# Patient Record
Sex: MALE | Race: WHITE | NOT HISPANIC OR LATINO | URBAN - METROPOLITAN AREA
[De-identification: names, ages, dates, MRNs, and addresses within clinical notes are randomized per-mention and may not be internally consistent; named-entity substitution may affect disease eponyms.]

---
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
## 2021-05-27 NOTE — PROGRESS NOTES
[facility]  Clinical Swallow Evaluation and Initial Plan of care    Patient Name: Collins Miner  Date of evaluation: 3/28/2019  Referral Diagnosis: [unfilled]  Referring provider: Carlitos Bryant MD  Visit Diagnosis:     ICD-10-CM    1. Esophageal dysphagia R13.10        Assessment:      Patient presents with no signs and symptoms of aspiration with all textures trialed    Patient demonstrated no oral and no pharyngeal dysphagia.    Patient goal:  None stated; stop vomiting implied  Patient's expectations/goals are realistic.    Rehab potential is good based on prior level of function, evaluation results, recent progress, motivation and cooperation, time post onset, medical status and behavior.     Plan / Patient Instructions:      Plan:  Evaluation only ; provided patient with diaphragmatic breathing techniques and education re: current medical diagnosis. Continuation of investigation may be warranted for psychoemotional treatment and/or GI management.    Diet/Swallow Plan: Regular and thin liquids    Strategies: diaphragmatic breathing techniques in attempts to reduce abdominal spasms contributing to rumination syndrome    Education:   Patient participated in education regarding evaluation results, treatment plan/rationale, home program and expected functional outcome  Patient demonstrate understanding    Referrals: N/A; possible psych eval to r/o emotional disturbances contributing to overall well being.        Subjective:      Social information:   Living Situation:single family home and lives with others    Occupation:student    History of Present Illness:    Collins is a 18 y.o. male who presents to therapy today with complaints of persistent rumination syndrome. Date of onset/duration of symptoms is approx 2 years.      Pertinent history includes listed above. Patient began increasingly nauseated, light headed and experiencing recurring emesis following PO intake approx 3 months ago  during stressful time in school. Patient has had H. Pylori in the past which mother had concerns about as of late. Patient has had multiple consultations re: GI d/t severity of disorder interfering with his education and schooling. Patient reported that he has experienced nxiety and stress in the past but does not suffer on ongoing basis. He is active in 3 sports, school, and friendships. He does not currently work. He is striving to go on to play football at the collegiate level.   Patient presents as alert and cooperative during the session. Patient accompanied to therapy by his mother this AM who exhibits strong involvement in patient's life.     Patient reports no pain     Objective:        Examination:    Patient was given thin and regular solid.    Oral Phase  Dentition: Intact    Oral hygiene: Good    Oral motor function was Not impaired.    Bolus prep and oral control was not impaired. Mastication was not impaired and the patient used rotary chewing.    Anterior-Posterior transit was not impaired.    Oral stasis did not occur with all textures trialed.    Pharyngeal Phase:    Thin:Patient trialed 4 ounces by cup and presented with no s/s of aspiration. Initiation of swallow appeared timely. Hyolaryngeal movement appeared intact .    Regular solid: Patient trialed >4 ounces and presented with no s/s of aspiration. Initiation of swallow appeared timely. Hyolaryngeal movement appeared intact         Interventions Today  Interventions MINUTES   Speech - Language    Cognition    Dysphagia 55   Assistive technology    Voice            Total 55          Michaela Agustin MS, CCC-SLP  3/28/2019

## 2025-08-11 ENCOUNTER — HOSPITAL ENCOUNTER (EMERGENCY)
Facility: CLINIC | Age: 25
Discharge: HOME | End: 2025-08-11
Payer: COMMERCIAL

## 2025-08-11 ENCOUNTER — APPOINTMENT (OUTPATIENT)
Dept: RADIOLOGY | Facility: CLINIC | Age: 25
End: 2025-08-11
Payer: COMMERCIAL

## 2025-08-11 VITALS
SYSTOLIC BLOOD PRESSURE: 139 MMHG | WEIGHT: 181.6 LBS | TEMPERATURE: 98 F | DIASTOLIC BLOOD PRESSURE: 85 MMHG | BODY MASS INDEX: 26 KG/M2 | HEART RATE: 83 BPM | HEIGHT: 70 IN | RESPIRATION RATE: 16 BRPM | OXYGEN SATURATION: 98 %

## 2025-08-11 DIAGNOSIS — S61.551A CAT BITE OF RIGHT WRIST, INITIAL ENCOUNTER: ICD-10-CM
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
## 2025-08-11 DIAGNOSIS — M79.631 PAIN OF RIGHT FOREARM: Primary | ICD-10-CM

## 2025-08-11 LAB
ANION GAP SERPL CALCULATED.3IONS-SCNC: 11 MMOL/L (ref 7–15)
BASOPHILS # BLD AUTO: 0 10E3/UL (ref 0–0.2)
BASOPHILS NFR BLD AUTO: 0 %
BUN SERPL-MCNC: 17.6 MG/DL (ref 6–20)
CALCIUM SERPL-MCNC: 9.2 MG/DL (ref 8.8–10.4)
CHLORIDE SERPL-SCNC: 104 MMOL/L (ref 98–107)
CREAT SERPL-MCNC: 1.27 MG/DL (ref 0.67–1.17)
CRP SERPL-MCNC: <3 MG/L
EGFRCR SERPLBLD CKD-EPI 2021: 81 ML/MIN/1.73M2
EOSINOPHIL # BLD AUTO: 0.1 10E3/UL (ref 0–0.7)
EOSINOPHIL NFR BLD AUTO: 1 %
ERYTHROCYTE [DISTWIDTH] IN BLOOD BY AUTOMATED COUNT: 13.1 % (ref 10–15)
GLUCOSE SERPL-MCNC: 131 MG/DL (ref 70–99)
HCO3 SERPL-SCNC: 23 MMOL/L (ref 22–29)
HCT VFR BLD AUTO: 42.5 % (ref 40–53)
HGB BLD-MCNC: 14.7 G/DL (ref 13.3–17.7)
IMM GRANULOCYTES # BLD: 0 10E3/UL
IMM GRANULOCYTES NFR BLD: 0 %
LYMPHOCYTES # BLD AUTO: 1.7 10E3/UL (ref 0.8–5.3)
LYMPHOCYTES NFR BLD AUTO: 14 %
MCH RBC QN AUTO: 30.6 PG (ref 26.5–33)
MCHC RBC AUTO-ENTMCNC: 34.6 G/DL (ref 31.5–36.5)
MCV RBC AUTO: 88 FL (ref 78–100)
MONOCYTES # BLD AUTO: 1.1 10E3/UL (ref 0–1.3)
MONOCYTES NFR BLD AUTO: 9 %
NEUTROPHILS # BLD AUTO: 9.3 10E3/UL (ref 1.6–8.3)
NEUTROPHILS NFR BLD AUTO: 76 %
NRBC # BLD AUTO: 0 10E3/UL
NRBC BLD AUTO-RTO: 0 /100
PLATELET # BLD AUTO: 357 10E3/UL (ref 150–450)
POTASSIUM SERPL-SCNC: 3.7 MMOL/L (ref 3.4–5.3)
RBC # BLD AUTO: 4.81 10E6/UL (ref 4.4–5.9)
SODIUM SERPL-SCNC: 138 MMOL/L (ref 135–145)
WBC # BLD AUTO: 12.3 10E3/UL (ref 4–11)

## 2025-08-11 PROCEDURE — 90471 IMMUNIZATION ADMIN: CPT

## 2025-08-11 PROCEDURE — 90715 TDAP VACCINE 7 YRS/> IM: CPT

## 2025-08-11 PROCEDURE — 86140 C-REACTIVE PROTEIN: CPT

## 2025-08-11 PROCEDURE — 250N000013 HC RX MED GY IP 250 OP 250 PS 637

## 2025-08-11 PROCEDURE — 73090 X-RAY EXAM OF FOREARM: CPT | Mod: RT

## 2025-08-11 PROCEDURE — 85004 AUTOMATED DIFF WBC COUNT: CPT

## 2025-08-11 PROCEDURE — 80048 BASIC METABOLIC PNL TOTAL CA: CPT

## 2025-08-11 PROCEDURE — 99284 EMERGENCY DEPT VISIT MOD MDM: CPT | Mod: 25

## 2025-08-11 PROCEDURE — 250N000011 HC RX IP 250 OP 636

## 2025-08-11 PROCEDURE — 36415 COLL VENOUS BLD VENIPUNCTURE: CPT

## 2025-08-11 RX ORDER — ACETAMINOPHEN 325 MG/1
650 TABLET ORAL ONCE
Status: COMPLETED | OUTPATIENT
Start: 2025-08-11 | End: 2025-08-11

## 2025-08-11 RX ADMIN — ACETAMINOPHEN 650 MG: 325 TABLET ORAL at 09:35

## 2025-08-11 RX ADMIN — CLOSTRIDIUM TETANI TOXOID ANTIGEN (FORMALDEHYDE INACTIVATED), CORYNEBACTERIUM DIPHTHERIAE TOXOID ANTIGEN (FORMALDEHYDE INACTIVATED), BORDETELLA PERTUSSIS TOXOID ANTIGEN (GLUTARALDEHYDE INACTIVATED), BORDETELLA PERTUSSIS FILAMENTOUS HEMAGGLUTININ ANTIGEN (FORMALDEHYDE INACTIVATED), BORDETELLA PERTUSSIS PERTACTIN ANTIGEN, AND BORDETELLA PERTUSSIS FIMBRIAE 2/3 ANTIGEN 0.5 ML: 5; 2; 2.5; 5; 3; 5 INJECTION, SUSPENSION INTRAMUSCULAR at 11:09

## 2025-08-11 ASSESSMENT — COLUMBIA-SUICIDE SEVERITY RATING SCALE - C-SSRS
2. HAVE YOU ACTUALLY HAD ANY THOUGHTS OF KILLING YOURSELF IN THE PAST MONTH?: NO
1. IN THE PAST MONTH, HAVE YOU WISHED YOU WERE DEAD OR WISHED YOU COULD GO TO SLEEP AND NOT WAKE UP?: NO
6. HAVE YOU EVER DONE ANYTHING, STARTED TO DO ANYTHING, OR PREPARED TO DO ANYTHING TO END YOUR LIFE?: NO

## 2025-08-11 ASSESSMENT — ACTIVITIES OF DAILY LIVING (ADL)
ADLS_ACUITY_SCORE: 41